# Patient Record
Sex: FEMALE | Race: WHITE | Employment: UNEMPLOYED | URBAN - METROPOLITAN AREA
[De-identification: names, ages, dates, MRNs, and addresses within clinical notes are randomized per-mention and may not be internally consistent; named-entity substitution may affect disease eponyms.]

---

## 2023-05-05 ENCOUNTER — TELEPHONE (OUTPATIENT)
Dept: CARDIOLOGY CLINIC | Age: 83
End: 2023-05-05

## 2023-05-05 NOTE — TELEPHONE ENCOUNTER
New  Referral from Dee Garnica for Dr. Quincy Albright for our  [facility] office       Where can I put this patient time/date    Referral has been scanned into the Epic for review

## 2023-05-18 ENCOUNTER — OFFICE VISIT (OUTPATIENT)
Dept: CARDIOLOGY CLINIC | Age: 83
End: 2023-05-18

## 2023-05-18 VITALS
HEART RATE: 78 BPM | SYSTOLIC BLOOD PRESSURE: 134 MMHG | WEIGHT: 153 LBS | OXYGEN SATURATION: 97 % | DIASTOLIC BLOOD PRESSURE: 72 MMHG

## 2023-05-18 DIAGNOSIS — I73.9 INTERMITTENT CLAUDICATION (HCC): ICD-10-CM

## 2023-05-18 DIAGNOSIS — G47.33 OSA (OBSTRUCTIVE SLEEP APNEA): ICD-10-CM

## 2023-05-18 DIAGNOSIS — I73.9 PAD (PERIPHERAL ARTERY DISEASE) (HCC): Primary | ICD-10-CM

## 2023-05-18 DIAGNOSIS — E78.2 MIXED HYPERLIPIDEMIA: ICD-10-CM

## 2023-05-18 DIAGNOSIS — I10 ESSENTIAL HYPERTENSION: ICD-10-CM

## 2023-05-18 RX ORDER — SIMVASTATIN 40 MG
TABLET ORAL
COMMUNITY

## 2023-05-18 RX ORDER — LISINOPRIL 20 MG/1
TABLET ORAL
COMMUNITY

## 2023-05-18 NOTE — PROGRESS NOTES
with normal intervals and axis    Image Review:     Right Arterial Doppler 5/4/23  Mild diffuse atherosclerotic irregularity  Right common femoral artery - mild  There is flow significant stenosis in the mid posterior tibial artery and high grade stenosis/occulusion of the anterior tibial artery       Assessment:  1. PAD/RLE claudication   2. Essential hypertension  3. Hyperlipidemia with goal LDL<100mg/dL  4. FENG    Plan:  Given her abnormal arterial doppler and symptoms, we discussed pursuing a peripheral angiogram. I discussed the risks and benefits of a peripheral angiogram with the patient. I also discussed the possible therapies and alternatives including medical management, angioplasty and stenting. She would like to think on this more and call the office when she decides on pursuing or not. She does states that she mostly likely will undergo the procedure. She denies any symptoms representing angina and her blood pressure is well controlled. Her most recent lipid profile was favorable on her current statin therapy. I have encouraged her to increase her aerobic activity as tolerated and adhere to a heart healthy diet. I have personally reviewed all previous testing for this visit today including imaging, lab results and EKG as detailed above. I will see her in office for follow up post procedure. Thank you very for allowing to help in the care of this very pleasant woman. This note was scribed in the presence of Dr Lyssa Hemphill MD by Veronica Santa RN. Physician Attestation:  The scribes documentation has been prepared under my direction and personally reviewed by me in its entirety. I, Dr. Lyssa Hemphill personally performed the services described in this documentation as scribed by my RN in my presence, and I confirm that the note above accurately reflects all work, treatment, procedures, and medical decision making performed by me.

## 2023-05-24 ENCOUNTER — TELEPHONE (OUTPATIENT)
Dept: CARDIOLOGY CLINIC | Age: 83
End: 2023-05-24

## 2023-05-24 NOTE — TELEPHONE ENCOUNTER
Discussed with Dr. Jay - patient can undergo procedure on her own terms. This is nothing urgent. Spoke with Brooke Calderon - she v/u and will call when she wishes to pursue. I also encouraged her to call with any worsening symptoms. She v/u.

## 2023-05-24 NOTE — TELEPHONE ENCOUNTER
Aline Smith called in stating she does want to pursue the peripheral angiogram, but not at this time due to her 's medical conditions. She states it could be as long as a year before she could undergo the procedure. She is concerned about the risks with waiting this long/asking for further recommendations in the meantime. Will discuss with Dr. Crow Kiser and call back. She v/u.

## 2023-08-10 ENCOUNTER — OFFICE VISIT (OUTPATIENT)
Dept: CARDIOLOGY CLINIC | Age: 83
End: 2023-08-10
Payer: MEDICARE

## 2023-08-10 VITALS
HEIGHT: 67 IN | WEIGHT: 153 LBS | BODY MASS INDEX: 24.01 KG/M2 | HEART RATE: 88 BPM | DIASTOLIC BLOOD PRESSURE: 64 MMHG | OXYGEN SATURATION: 96 % | SYSTOLIC BLOOD PRESSURE: 126 MMHG

## 2023-08-10 DIAGNOSIS — G47.33 OSA (OBSTRUCTIVE SLEEP APNEA): ICD-10-CM

## 2023-08-10 DIAGNOSIS — I73.9 PAD (PERIPHERAL ARTERY DISEASE) (HCC): Primary | ICD-10-CM

## 2023-08-10 DIAGNOSIS — I73.9 INTERMITTENT CLAUDICATION (HCC): ICD-10-CM

## 2023-08-10 DIAGNOSIS — E78.2 MIXED HYPERLIPIDEMIA: ICD-10-CM

## 2023-08-10 DIAGNOSIS — I10 ESSENTIAL HYPERTENSION: ICD-10-CM

## 2023-08-10 PROCEDURE — G8427 DOCREV CUR MEDS BY ELIG CLIN: HCPCS | Performed by: INTERNAL MEDICINE

## 2023-08-10 PROCEDURE — 1123F ACP DISCUSS/DSCN MKR DOCD: CPT | Performed by: INTERNAL MEDICINE

## 2023-08-10 PROCEDURE — 1036F TOBACCO NON-USER: CPT | Performed by: INTERNAL MEDICINE

## 2023-08-10 PROCEDURE — 3074F SYST BP LT 130 MM HG: CPT | Performed by: INTERNAL MEDICINE

## 2023-08-10 PROCEDURE — G8400 PT W/DXA NO RESULTS DOC: HCPCS | Performed by: INTERNAL MEDICINE

## 2023-08-10 PROCEDURE — G8420 CALC BMI NORM PARAMETERS: HCPCS | Performed by: INTERNAL MEDICINE

## 2023-08-10 PROCEDURE — 1090F PRES/ABSN URINE INCON ASSESS: CPT | Performed by: INTERNAL MEDICINE

## 2023-08-10 PROCEDURE — 99214 OFFICE O/P EST MOD 30 MIN: CPT | Performed by: INTERNAL MEDICINE

## 2023-08-10 PROCEDURE — 3078F DIAST BP <80 MM HG: CPT | Performed by: INTERNAL MEDICINE

## 2023-08-10 RX ORDER — AMLODIPINE BESYLATE 5 MG/1
5 TABLET ORAL
COMMUNITY
Start: 2023-07-18

## 2023-08-10 NOTE — PROGRESS NOTES
[address] [facility]  1940    August 10, 2023    Reason for Consult: PAD    CC: \"I'm not sure about getting the procedure\"     HPI:  The patient is 80 y.o. female with a past medical history significant for hypertension, hyperlipidemia and FENG who presents for evaluation of PAD. She stated that she was having what she thought was orthopedic issues with continued right leg pain and numbness since November. Her PCP ordered arterial dopplers that showed PAD of the right lower extremity. She stated that she participated in physical therapy and this improved the numbness but she continues to endorse pain. She had a peripheral angiogram scheduled 7/24/23 but she canceled. She is accompanied by her daughter and presents today to discuss pursuing the peripheral angiogram. She reports improvement with her leg pain and is unsure about proceeding. She denies any chest pains or worsening shortness of breath. She reports medication compliance and is tolerating. She denies any abnormal bleeding or bruising. She denies exertional chest pain/pressure, dyspnea at rest, worsening LYNN, PND, orthopnea, palpitations, lightheadedness, weight changes, changes in LE edema, and syncope. Review of Systems:  Constitutional: No fatigue, weakness, night sweats or fever. HEENT: No new vision difficulties or ringing in the ears. Respiratory: No new SOB, PND, orthopnea or cough. Cardiovascular: See HPI   GI: No n/v, diarrhea, constipation, abdominal pain or changes in bowel habits. No melena, no hematochezia  : No urinary frequency, urgency, incontinence, hematuria or dysuria. Skin: No cyanosis or skin lesions. Musculoskeletal: Positive for right leg pain. No new muscle or joint pain. Neurological: No syncope or TIA-like symptoms.   Psychiatric: No anxiety, insomnia or depression     Past Medical History:   Diagnosis Date    Hyperlipidemia     Hypertension     FENG (obstructive sleep apnea)

## 2023-08-14 ENCOUNTER — TELEPHONE (OUTPATIENT)
Dept: CARDIOLOGY CLINIC | Age: 83
End: 2023-08-14

## 2023-08-14 NOTE — TELEPHONE ENCOUNTER
Silvio Holman called in this morning, she would like to schedule her angiogram.      She can be reached at [phone].

## 2023-08-14 NOTE — TELEPHONE ENCOUNTER
There is already an encounter open from 5/24/23 - Kevin Rogers left her a voicemail to return call this morning.

## 2023-10-10 LAB
ANION GAP SERPL CALCULATED.3IONS-SCNC: 7 MMOL/L
BASOPHILS ABSOLUTE: 0 X10(3)/UL
BASOPHILS RELATIVE PERCENT: 0.7 %
BUN BLDV-MCNC: 15 MG/DL
CALCIUM SERPL-MCNC: 9.6 MG/DL
CHLORIDE BLD-SCNC: 101 MMOL/L
CO2: 30.3 MMOL/L
CORRECTED WBC: 4.5 X10(3)/UL
CREAT SERPL-MCNC: 0.72 MG/DL
EGFR (CKD-EPI): 83
EOSINOPHILS ABSOLUTE COUNT: 0.2 X10(3)/UL
EOSINOPHILS RELATIVE PERCENT: 3.8 %
ERYTHROCYTE [DISTWIDTH] IN BLOOD BY AUTOMATED COUNT: 13 %
GLUCOSE: 89 MG/DL
HCT VFR BLD CALC: 39.2 %
HEMOGLOBIN: 12.7 GM/DL
IMMATURE GRANS (ABS): 0.01 X10(3)/UL
IMMATURE GRANULOCYTES %: 0.2 %
LYMPHOCYTES ABSOLUTE: 1.4 X10(3)/UL
LYMPHOCYTES RELATIVE PERCENT: 30.9 %
MCH RBC QN AUTO: 29 PG
MCHC RBC AUTO-ENTMCNC: 32 GM/DL
MCV RBC AUTO: 88 FL
MONOCYTES ABSOLUTE: 0.4 X10(3)/UL
MONOCYTES RELATIVE PERCENT: 8.9 %
NEUTROPHILS ABSOLUTE: 2.5 X10(3)/UL
NEUTROPHILS RELATIVE PERCENT: 55.5 %
NRBC ABSOLUTE: 0 X10(3)/UL
NRBC AUTO PCT: 0 /100 WBCS
PLATELET # BLD: 276 X10(3)/UL
PMV BLD AUTO: 9.3 FL
POTASSIUM SERPL-SCNC: 4.6 MMOL/L
RBC # BLD: 4.43 X10(6)/UL
SODIUM BLD-SCNC: 138 MMOL/L
WBC: 4.5 X10(3)/UL

## 2023-10-16 ENCOUNTER — HOSPITAL ENCOUNTER (OUTPATIENT)
Dept: CARDIAC CATH/INVASIVE PROCEDURES | Age: 83
Discharge: HOME | End: 2023-10-16
Payer: MEDICARE

## 2023-10-16 VITALS
RESPIRATION RATE: 20 BRPM | DIASTOLIC BLOOD PRESSURE: 60 MMHG | HEART RATE: 73 BPM | BODY MASS INDEX: 24.17 KG/M2 | OXYGEN SATURATION: 100 % | HEIGHT: 67 IN | SYSTOLIC BLOOD PRESSURE: 137 MMHG | WEIGHT: 154 LBS | TEMPERATURE: 97.3 F

## 2023-10-16 DIAGNOSIS — I73.9 INTERMITTENT CLAUDICATION (HCC): ICD-10-CM

## 2023-10-16 PROCEDURE — 6360000004 HC RX CONTRAST MEDICATION: Performed by: INTERNAL MEDICINE

## 2023-10-16 PROCEDURE — 2500000003 HC RX 250 WO HCPCS

## 2023-10-16 PROCEDURE — 6360000002 HC RX W HCPCS: Performed by: INTERNAL MEDICINE

## 2023-10-16 PROCEDURE — 6360000002 HC RX W HCPCS

## 2023-10-16 PROCEDURE — 2709999900 HC NON-CHARGEABLE SUPPLY

## 2023-10-16 PROCEDURE — 75625 CONTRAST EXAM ABDOMINL AORTA: CPT

## 2023-10-16 PROCEDURE — C1894 INTRO/SHEATH, NON-LASER: HCPCS

## 2023-10-16 PROCEDURE — 36246 INS CATH ABD/L-EXT ART 2ND: CPT

## 2023-10-16 PROCEDURE — 75716 ARTERY X-RAYS ARMS/LEGS: CPT

## 2023-10-16 PROCEDURE — C1769 GUIDE WIRE: HCPCS

## 2023-10-16 PROCEDURE — 99152 MOD SED SAME PHYS/QHP 5/>YRS: CPT

## 2023-10-16 PROCEDURE — 99153 MOD SED SAME PHYS/QHP EA: CPT

## 2023-10-16 PROCEDURE — 2580000003 HC RX 258: Performed by: INTERNAL MEDICINE

## 2023-10-16 PROCEDURE — G0269 OCCLUSIVE DEVICE IN VEIN ART: HCPCS

## 2023-10-16 RX ORDER — SODIUM CHLORIDE 0.9 % (FLUSH) 0.9 %
5-40 SYRINGE (ML) INJECTION EVERY 12 HOURS SCHEDULED
Status: DISCONTINUED | OUTPATIENT
Start: 2023-10-16 | End: 2023-10-17 | Stop reason: HOSPADM

## 2023-10-16 RX ORDER — SODIUM CHLORIDE 0.9 % (FLUSH) 0.9 %
5-40 SYRINGE (ML) INJECTION PRN
Status: DISCONTINUED | OUTPATIENT
Start: 2023-10-16 | End: 2023-10-17 | Stop reason: HOSPADM

## 2023-10-16 RX ORDER — ONDANSETRON 2 MG/ML
4 INJECTION INTRAMUSCULAR; INTRAVENOUS EVERY 6 HOURS PRN
Status: DISCONTINUED | OUTPATIENT
Start: 2023-10-16 | End: 2023-10-17 | Stop reason: HOSPADM

## 2023-10-16 RX ORDER — ASPIRIN 325 MG
325 TABLET ORAL DAILY
Status: DISCONTINUED | OUTPATIENT
Start: 2023-10-16 | End: 2023-10-17 | Stop reason: HOSPADM

## 2023-10-16 RX ORDER — ACETAMINOPHEN 325 MG/1
650 TABLET ORAL EVERY 4 HOURS PRN
Status: DISCONTINUED | OUTPATIENT
Start: 2023-10-16 | End: 2023-10-17 | Stop reason: HOSPADM

## 2023-10-16 RX ORDER — SODIUM CHLORIDE 9 MG/ML
INJECTION, SOLUTION INTRAVENOUS PRN
Status: DISCONTINUED | OUTPATIENT
Start: 2023-10-16 | End: 2023-10-17 | Stop reason: HOSPADM

## 2023-10-16 RX ADMIN — SODIUM CHLORIDE: 9 INJECTION, SOLUTION INTRAVENOUS at 07:26

## 2023-10-16 RX ADMIN — ONDANSETRON 4 MG: 2 INJECTION INTRAMUSCULAR; INTRAVENOUS at 09:07

## 2023-10-16 RX ADMIN — IOPAMIDOL 100 ML: 755 INJECTION, SOLUTION INTRAVENOUS at 08:44

## 2023-10-16 NOTE — FLOWSHEET NOTE
1035:  Bedrest completed. HOB raised. Left groin remains free from complication. Patient states she does not feel good. C/O nausea. PRN Zofran already administered. Lights dimmed and cool washrag provided. VSS; see flowsheet. Will notify MD.     [phone]: Discharge instructions given to patient's daughter. Instructions reviewed and all questions answered at present time. Patient and daughter aware of scheduled follow-up appointment.      Electronically signed by Todd Dodge RN on 10/16/2023 at 10:46 AM

## 2023-10-16 NOTE — DISCHARGE INSTRUCTIONS
PERIPHERAL ANGIOGRAM    Care of your puncture site:  Remove bandage 24 hours after the procedure. May shower in 24 hours but do not sit in a bathtub/pool of water for 5 days or until the wound is healed. Gently clean groin using soap and water. Dry thoroughly and apply a Band-Aid that covers the entire site. Use Band-Aid until skin heals over in about 3-5 days. Do not apply powder or lotion. Normal Observations:  Soreness or tenderness which may last one week. Mild oozing from the incision site. Possible bruising that could last 2 weeks. Activity:  You may resume driving 24 hours following the procedure. Do not make important / legal decisions within 24 hours after procedure. You may resume normal activity in 5 days or after the wound heals. Avoid lifting more than 10 pounds for 5 days or until the wound heals. Avoid strenuous exercise or activity for 1 week. You may return to work in 5 day(s), if applicable. Nutrition:  Regular diet or as directed by your doctor. Drink at least 8 to 10 glasses of decaffeinated, non-alcoholic fluid for the next 24 hours to flush the x-ray dye used for your angiogram out of your body. Call your doctor immediately if your condition worsens, for any other concerns, for a follow-up appointment or if you experience any of the following:  Increased swelling on the groin or leg. Unusual pain, numbness, or tingling of the groin or down the leg. Any signs of infection such as: redness, yellow drainage at the site, swelling or pain.     IF GROIN STARTS BLEEDING SIGNIFICANTLY:   LAY FLAT, HOLD FIRM DIRECT PRESSURE, AND CALL 911    Other Instructions:  Hold Metformin or Metformin containing drugs for 48 hours after procedure if applicable

## 2023-10-16 NOTE — FLOWSHEET NOTE
Dr. Wylie Province at beside talking to patient and patient's daughter. Patient to follow-up in the [facility] [place] office in about 3 months.     Electronically signed by Naga Mascorro RN on 10/16/2023 at 9:02 AM

## 2023-10-16 NOTE — FLOWSHEET NOTE
Patient arrived to cath lab. Patient awake, alert and oriented x4. No current complaints, no distress noted. All questions answered at present time. Family at bedside. Call light within reach.      PRE-PROCEDURE      DATE: 10/16/2023 ARRIVAL TO CATH LAB: 6:49 AM    ADMIT SOURCE: Outpatient    ID & ALLERGY BAND: On    CONSENT: Yes    NPO SINCE: Midnight    LABS: Yes  PREGNANCY TEST: NA;  1940    LABS:  BUN/CREAT: 15 / 0.72  H/H: 12.7 / 39.2  PLT: 276    PULSES - Bilateral Doppler to Right and Left DP & PT    IV SITE : Started in Right AC.  with NS fluids infusing at 75 6:49 AM       BLEEDING PROBLEMS: No  BLEEDING RISK: 2.4%    Low Risk < 2%  Intermediate Risk >2% or < 6.5%  High Risk >6.5%        LAST DOSE (if applicable):  ASA: 953DW PO 10/16/23  P2Y12 (Plavix, Effient, Brilinta): NA  Anticoagulants (Coumadin, Xarelto, Eliquis): NA  Other Blood Thinners: NA      OTHER MEDICATIONS TAKEN AT HOME: 325mg ASA PO      MEDICATION COMPLIANCE: Yes       Electronically signed by Thom Gillespie RN on 10/16/2023 at 7:06 AM

## 2023-10-16 NOTE — PROCEDURES
procedure. Estimated blood loss was less than 20 mL. Moderate sedation was administered for the procedure by an independent  agent at my direction and supervision. A total of 2 mg of IV Versed and  200 mcg of fentanyl was given. The patient had an ASA grade of II and a  Mallampati score of II. Vital signs were monitored throughout and  remained stable. Total duration of sedation was 25 minutes. FINDINGS:  1. Patent abdominal aorta and bilateral renal arteries with minimal  atherosclerotic disease. Celiac trunk, superior mesenteric, and  inferior mesenteric arteries are patent. 2.  Patent bilateral common internal and external iliac arteries with  minimal atherosclerotic disease. 3.  Patent bilateral common femoral and profunda femoris arteries. Patent bilateral superficial femoral arteries has less than 50%  atherosclerotic plaque disease. 4.  Patent bilateral popliteal arteries with minimal atherosclerotic  disease. 5.  Patent tibioperoneal trunks with three-vessel runoff to the foot  bilaterally. The predominant artery bilaterally to the foot is the  peroneal artery. On the left, the tibial vessels are poorly visualized. On the right, the anterior tibial and posterior tibial arteries remained  patent with significant plaque disease of 75% throughout. There is good  perfusion to the foot. PLAN:  The patient's pain has improved. She has no nonhealing ulcers or  rest pain. I think I would medically manage the patient at this time  for her tibial vessel disease below the knee.         Aroldo Carl MD    D: 10/16/2023 8:53:23       T: 10/16/2023 8:59:21     [email or web address]  Job#: [id number]     Doc#: [id number]    CC:  Martir Thompson MD

## 2023-10-16 NOTE — ANESTHESIA PRE-OP
H&P Update    I have reviewed the history and physical and examined the patient and find no relevant changes. I have reviewed with the patient and/or family the risks, benefits, and alternatives to the procedure. Pre-sedation Assessment    Patient:  Darling Payer   :   1940  Intended Procedure: Peripheral Angiogram    [facility] nurse's notes reviewed and agreed. Medications reviewed  Allergies: Allergies   Allergen Reactions    Atorvastatin     Sulfa Antibiotics          Pre-Procedure Assessment/Plan:  ASA 2 - Patient with mild systemic disease with no functional limitations  Mallampati 2  Level of Sedation Plan: Moderate sedation    Anginal Classification w/in 2 weeks: 0    Heart Failure w/in 2 weeks:  If yes NYHA class: 0    Post Procedure plan: Return to same level of care

## 2023-10-16 NOTE — FLOWSHEET NOTE
Patient received post procedure in stable condition. Post-cath handoff/site assessment performed to left groin. No complication noted  Pt is drowsy but awakens easily, No distress noted. VSS-see flowsheet. Post-cath restrictions/instructions provided  Patient provided with snack/drink. No further needs at this time, call light within reach. Family at bedside  MD to talk to patient and family soon.      Electronically signed by Doug Gallo RN on 10/16/2023 at 8:56 AM

## 2023-10-16 NOTE — FLOWSHEET NOTE
1120: patient states she feels better and is ready to go home. Patient sitting on the side of the bed, no current complaints. Left groin remains free from complication. 1140: Patient ambulated to BR (+void) and back to room without difficulty. No complications noted to groin. 1150: Discharge instructions reviewed with patient and patient's daughter. Follow-up appointment made. All questions answered at present time. PIV removed without complication. Patient getting dressed for discharge. 1200: Patient discharged via wheelchair off unit with belongings.        Electronically signed by Hai Joiner RN on 10/16/2023

## 2023-10-16 NOTE — H&P
Reason for Consult: PAD     CC: \"I'm not sure about getting the procedure\"      HPI:  The patient is 80 y.o. female with a past medical history significant for hypertension, hyperlipidemia and FENG who presents for evaluation of PAD. She stated that she was having what she thought was orthopedic issues with continued right leg pain and numbness since November. Her PCP ordered arterial dopplers that showed PAD of the right lower extremity. She stated that she participated in physical therapy and this improved the numbness but she continues to endorse pain. She had a peripheral angiogram scheduled 7/24/23 but she canceled. She is accompanied by her daughter and presents today to discuss pursuing the peripheral angiogram. She reports improvement with her leg pain and is unsure about proceeding. She denies any chest pains or worsening shortness of breath. She reports medication compliance and is tolerating. She denies any abnormal bleeding or bruising. She denies exertional chest pain/pressure, dyspnea at rest, worsening LYNN, PND, orthopnea, palpitations, lightheadedness, weight changes, changes in LE edema, and syncope. Review of Systems:  Constitutional: No fatigue, weakness, night sweats or fever. HEENT: No new vision difficulties or ringing in the ears. Respiratory: No new SOB, PND, orthopnea or cough. Cardiovascular: See HPI   GI: No n/v, diarrhea, constipation, abdominal pain or changes in bowel habits. No melena, no hematochezia  : No urinary frequency, urgency, incontinence, hematuria or dysuria. Skin: No cyanosis or skin lesions. Musculoskeletal: Positive for right leg pain. No new muscle or joint pain. Neurological: No syncope or TIA-like symptoms.   Psychiatric: No anxiety, insomnia or depression          Past Medical History:   Diagnosis Date    Hyperlipidemia      Hypertension      FENG (obstructive sleep apnea)        Family History:  Reviewed and unremarkable     Social History

## 2024-01-24 NOTE — PROGRESS NOTES
[facility]    Shannon Licona  1940 January 16, 2024    Reason for Consult: PAD    CC: \"    HPI:  The patient is 83 y.o. female with a past medical history significant for hypertension, hyperlipidemia, PAD, and FENG. She was originally seen for evaluation of PAD. She stated that she was having what she thought was orthopedic issues with continued right leg pain and numbness since November. Her PCP ordered arterial dopplers that showed PAD of the right lower extremity. She stated that she participated in physical therapy and this improved the numbness but she continued to endorse pain. She had a peripheral angiogram scheduled 7/24/23 but she canceled. She opted to proceed with the peripheral 10/16/23 and will be treated medically.                   Review of Systems:  Constitutional: No fatigue, weakness, night sweats or fever.   HEENT: No new vision difficulties or ringing in the ears.  Respiratory: No new SOB, PND, orthopnea or cough.   Cardiovascular: See HPI   GI: No n/v, diarrhea, constipation, abdominal pain or changes in bowel habits.  No melena, no hematochezia  : No urinary frequency, urgency, incontinence, hematuria or dysuria.  Skin: No cyanosis or skin lesions.  Musculoskeletal: Positive for right leg pain. No new muscle or joint pain.  Neurological: No syncope or TIA-like symptoms.  Psychiatric: No anxiety, insomnia or depression     Past Medical History:   Diagnosis Date    Hyperlipidemia     Hypertension     FENG (obstructive sleep apnea)      Family History:  Reviewed and unremarkable    Social History     Tobacco Use    Smoking status: Never     Passive exposure: Never    Smokeless tobacco: Never       Allergies   Allergen Reactions    Atorvastatin     Sulfa Antibiotics      Current Outpatient Medications   Medication Sig Dispense Refill    amLODIPine (NORVASC) 5 MG tablet Take 1 tablet by mouth      simvastatin (ZOCOR) 40 MG tablet simvastatin 40 mg tablet   TAKE 1 
rhonchi or rales.   Abdominal: Soft, non-tender. Bowel sounds and aorta are normal. She exhibits no organomegaly, mass or bruit.   Extremities: No edema, cyanosis, or clubbing. Pulses are 2+ radial/carotid bilaterally.  Neurological: She is alert and oriented to person, place, and time. She has normal reflexes. No cranial nerve deficit. Coordination normal.   Skin: Skin is warm and dry. There is no rash or diaphoresis.   Psychiatric: She has a normal mood and affect. Her speech is normal and behavior is normal.     Personally reviewed and interpreted   EKG Interpretation: 5/18/23 Sinus rhythm with normal intervals and axis    Procedures:   Peripheral angiogram 10/16/23  1.  Patent abdominal aorta and bilateral renal arteries with minimal atherosclerotic disease.  Celiac trunk, superior mesenteric, and inferior mesenteric arteries are patent.  2.  Patent bilateral common internal and external iliac arteries with minimal atherosclerotic disease.  3.  Patent bilateral common femoral and profunda femoris arteries. Patent bilateral superficial femoral arteries has less than 50% atherosclerotic plaque disease.  4.  Patent bilateral popliteal arteries with minimal atherosclerotic disease.  5.  Patent tibioperoneal trunks with three-vessel runoff to the foot bilaterally.  The predominant artery bilaterally to the foot is the peroneal artery.  On the left, the tibial vessels are poorly visualized.  On the right, the anterior tibial and posterior tibial arteries remained patent with significant plaque disease of 75% throughout.  There is good perfusion to the foot.    Image Review:     Right Arterial Doppler 5/4/23  Mild diffuse atherosclerotic irregularity  Right common femoral artery - mild  There is flow significant stenosis in the mid posterior tibial artery and high grade stenosis/occulusion of the anterior tibial artery     Assessment:  1. PAD/RLE claudication   2. Essential hypertension  3. Hyperlipidemia with goal

## 2024-01-25 ENCOUNTER — OFFICE VISIT (OUTPATIENT)
Dept: CARDIOLOGY CLINIC | Age: 84
End: 2024-01-25
Payer: MEDICARE

## 2024-01-25 VITALS
HEART RATE: 73 BPM | BODY MASS INDEX: 23.32 KG/M2 | WEIGHT: 148.6 LBS | HEIGHT: 67 IN | DIASTOLIC BLOOD PRESSURE: 70 MMHG | SYSTOLIC BLOOD PRESSURE: 158 MMHG | OXYGEN SATURATION: 98 %

## 2024-01-25 DIAGNOSIS — G47.33 OSA (OBSTRUCTIVE SLEEP APNEA): ICD-10-CM

## 2024-01-25 DIAGNOSIS — E78.2 MIXED HYPERLIPIDEMIA: ICD-10-CM

## 2024-01-25 DIAGNOSIS — I73.9 INTERMITTENT CLAUDICATION (HCC): ICD-10-CM

## 2024-01-25 DIAGNOSIS — I10 ESSENTIAL HYPERTENSION: ICD-10-CM

## 2024-01-25 DIAGNOSIS — I73.9 PAD (PERIPHERAL ARTERY DISEASE) (HCC): Primary | ICD-10-CM

## 2024-01-25 PROCEDURE — 1123F ACP DISCUSS/DSCN MKR DOCD: CPT | Performed by: INTERNAL MEDICINE

## 2024-01-25 PROCEDURE — 1036F TOBACCO NON-USER: CPT | Performed by: INTERNAL MEDICINE

## 2024-01-25 PROCEDURE — 3078F DIAST BP <80 MM HG: CPT | Performed by: INTERNAL MEDICINE

## 2024-01-25 PROCEDURE — 99214 OFFICE O/P EST MOD 30 MIN: CPT | Performed by: INTERNAL MEDICINE

## 2024-01-25 PROCEDURE — G8400 PT W/DXA NO RESULTS DOC: HCPCS | Performed by: INTERNAL MEDICINE

## 2024-01-25 PROCEDURE — G8420 CALC BMI NORM PARAMETERS: HCPCS | Performed by: INTERNAL MEDICINE

## 2024-01-25 PROCEDURE — 1090F PRES/ABSN URINE INCON ASSESS: CPT | Performed by: INTERNAL MEDICINE

## 2024-01-25 PROCEDURE — 3077F SYST BP >= 140 MM HG: CPT | Performed by: INTERNAL MEDICINE

## 2024-01-25 PROCEDURE — G8484 FLU IMMUNIZE NO ADMIN: HCPCS | Performed by: INTERNAL MEDICINE

## 2024-01-25 PROCEDURE — G8427 DOCREV CUR MEDS BY ELIG CLIN: HCPCS | Performed by: INTERNAL MEDICINE
